# Patient Record
Sex: MALE | Race: WHITE | Employment: FULL TIME | URBAN - METROPOLITAN AREA
[De-identification: names, ages, dates, MRNs, and addresses within clinical notes are randomized per-mention and may not be internally consistent; named-entity substitution may affect disease eponyms.]

---

## 2024-11-03 ENCOUNTER — APPOINTMENT (OUTPATIENT)
Dept: ULTRASOUND IMAGING | Facility: HOSPITAL | Age: 44
End: 2024-11-03
Attending: EMERGENCY MEDICINE
Payer: COMMERCIAL

## 2024-11-03 ENCOUNTER — HOSPITAL ENCOUNTER (EMERGENCY)
Facility: HOSPITAL | Age: 44
Discharge: HOME | End: 2024-11-03
Attending: EMERGENCY MEDICINE
Payer: COMMERCIAL

## 2024-11-03 VITALS
RESPIRATION RATE: 30 BRPM | TEMPERATURE: 97 F | HEART RATE: 61 BPM | SYSTOLIC BLOOD PRESSURE: 122 MMHG | DIASTOLIC BLOOD PRESSURE: 93 MMHG | OXYGEN SATURATION: 97 % | WEIGHT: 315 LBS

## 2024-11-03 DIAGNOSIS — N50.811 TESTICULAR PAIN, RIGHT: Primary | ICD-10-CM

## 2024-11-03 PROCEDURE — 99284 EMERGENCY DEPT VISIT MOD MDM: CPT

## 2024-11-03 PROCEDURE — 96375 TX/PRO/DX INJ NEW DRUG ADDON: CPT

## 2024-11-03 PROCEDURE — 99285 EMERGENCY DEPT VISIT HI MDM: CPT

## 2024-11-03 PROCEDURE — 76870 US EXAM SCROTUM: CPT | Performed by: EMERGENCY MEDICINE

## 2024-11-03 PROCEDURE — 93975 VASCULAR STUDY: CPT | Performed by: EMERGENCY MEDICINE

## 2024-11-03 PROCEDURE — 96374 THER/PROPH/DIAG INJ IV PUSH: CPT

## 2024-11-03 RX ORDER — HYDROMORPHONE HYDROCHLORIDE 1 MG/ML
1 INJECTION, SOLUTION INTRAMUSCULAR; INTRAVENOUS; SUBCUTANEOUS ONCE
Status: COMPLETED | OUTPATIENT
Start: 2024-11-03 | End: 2024-11-03

## 2024-11-03 RX ORDER — HYDROMORPHONE HYDROCHLORIDE 1 MG/ML
1 INJECTION, SOLUTION INTRAMUSCULAR; INTRAVENOUS; SUBCUTANEOUS ONCE
Status: DISCONTINUED | OUTPATIENT
Start: 2024-11-03 | End: 2024-11-03

## 2024-11-03 RX ORDER — MORPHINE SULFATE 4 MG/ML
4 INJECTION, SOLUTION INTRAMUSCULAR; INTRAVENOUS ONCE
Status: DISCONTINUED | OUTPATIENT
Start: 2024-11-03 | End: 2024-11-03

## 2024-11-03 RX ORDER — ONDANSETRON 2 MG/ML
4 INJECTION INTRAMUSCULAR; INTRAVENOUS ONCE
Status: COMPLETED | OUTPATIENT
Start: 2024-11-03 | End: 2024-11-03

## 2024-11-03 RX ORDER — KETOROLAC TROMETHAMINE 15 MG/ML
15 INJECTION, SOLUTION INTRAMUSCULAR; INTRAVENOUS ONCE
Status: DISCONTINUED | OUTPATIENT
Start: 2024-11-03 | End: 2024-11-03

## 2024-11-03 RX ORDER — ONDANSETRON 2 MG/ML
INJECTION INTRAMUSCULAR; INTRAVENOUS
Status: COMPLETED
Start: 2024-11-03 | End: 2024-11-03

## 2024-11-03 RX ORDER — NAPROXEN 500 MG/1
500 TABLET ORAL 2 TIMES DAILY PRN
Qty: 20 TABLET | Refills: 0 | Status: SHIPPED | OUTPATIENT
Start: 2024-11-03 | End: 2024-11-10

## 2024-11-03 RX ORDER — KETOROLAC TROMETHAMINE 30 MG/ML
30 INJECTION, SOLUTION INTRAMUSCULAR; INTRAVENOUS ONCE
Status: COMPLETED | OUTPATIENT
Start: 2024-11-03 | End: 2024-11-03

## 2024-11-03 NOTE — ED PROVIDER NOTES
Patient Seen in: [facility] Emergency Department      History     Chief Complaint   Patient presents with    Jatin     SAT DOWN THIS AM AND TWISTED HIS RIGHT TESTICLE. APPEARS TO BE IN A LOT OF PAIN AT TL.      Stated Complaint: EVAL G    Subjective:   HPI      Patient was hunting this morning, sat down awkwardly on a stool, believes he sat down his right testicle and has been in severe pain since.    Nauseous, never had pain that lasted this long before.        Objective:     No pertinent past medical history.            No pertinent past surgical history.              No pertinent social history.                Physical Exam     ED Triage Vitals   BP --    Pulse 11/03/24 0828 88   Resp 11/03/24 0828 (!) 30   Temp 11/03/24 0845 96.9 °F (36.1 °C)   Temp src 11/03/24 0845 Temporal   SpO2 11/03/24 0828 97 %   O2 Device 11/03/24 0828 None (Room air)       Current Vitals:   Vital Signs  BP: -- (unable to tollerate)  Pulse: 88  Resp: (!) 30  Temp: 96.9 °F (36.1 °C)  Temp src: Temporal    Oxygen Therapy  SpO2: 97 %  O2 Device: None (Room air)        Physical Exam  Physical Exam   Constitutional: Awake, alert, well appearing  Head: Normocephalic and atraumatic.   Eyes: Conjunctivae are normal. Pupils are equal, round, and reactive to light.   Neck: Normal range of motion. No JVD  Cardiovascular: Normal rate, regular rhythm  Pulmonary/Chest: Normal effort.  No accessory muscle use.  No cyanosis.  Abdominal: Soft. Not distended.  Neurological: Pt is alert and oriented to person, place, and time. no cranial nerve deficits. Speech fluent      The right testicle is exquisitely tender, appears to be high riding with an unusual lie.    ED Course   Labs Reviewed - No data to display       Patient was seen upon arrival.  There was clinical concern for torsion but the history was not quite classic for this.  He was given pain meds and taken ultrasound without delay.    Medications   ketorolac (Toradol) 30 MG/ML injection 30  mg (30 mg Intramuscular Given 11/3/24 0845)   HYDROmorphone (Dilaudid) 1 MG/ML injection 1 mg (1 mg Intravenous Given 11/3/24 0844)   ondansetron (Zofran) 4 MG/2ML injection 4 mg (4 mg Intravenous Given 11/3/24 0843)         US SCROTUM W/ DOPPLER (CPT=93975/77773)    Result Date: 11/3/2024  PROCEDURE:  US SCROTUM W/ DOPPLER (CPT=93975/29782)  COMPARISON:  None.  INDICATIONS:  eval for torsion  TECHNIQUE:  Real time grey scale ultrasound was performed of the scrotal contents including the testicles, epididymis, spermatic cord, and scrotal wall. A duplex scan with B-mode, Doppler color flow, and spectral analysis were also performed.  PATIENT STATED HISTORY: (As transcribed by Technologist)  Patient with right testicle pain.    FINDINGS:  TESTES:  Right testicle measures 5.1 x 2.9 x 3.3 cm.  Left testicle measures 5.1 x 2.8 x 3.5 cm.  There are scattered calcifications in both testicles.  There is fairly symmetric Doppler signal with normal Doppler tracings in both testicles. EPIDIDYMIS:  There is mild enlargement of the epididymides bilaterally. HYDROCELE:  Mild amount of fluid in left scrotum could be physiologic. VARICOCELE:  Left-sided varicocele is noted. OTHER:  Negative.            CONCLUSION:  1. There are scattered calcifications in both testicles.  There is no focal mass detected. 2. There is fairly symmetric Doppler signal with normal Doppler tracings in both testicles without specific evidence of torsion. 3. Mild diffuse enlargement of the epididymides bilaterally is noted which could represent epididymitis in the proper clinical setting. 4. Small amount of fluid left scrotum could be physiologic.   LOCATION:  [facility]    Dictated by (CST): Hiram Galeas MD on 11/03/2024 at 9:07 AM     Finalized by (CST): Hiram Galeas MD on 11/03/2024 at 9:10 AM          I reexamined the patient afterwards.  He is much more comfortable.  His testicular exam at this point was normal.  Cremasteric reflex noted with just  lifting the sheets, testicles nontender nonswollen vertical lie no epididymal tenderness.       MDM            Differential diagnoses considered: Testicular torsion, testicular contusion, Muscle spasm all considered    -Reexam after imaging pain meds reassuring.  Patient is much more comfortable.    -Patient's history, exam, diagnostic studies, clinical course were discussed with Dr. Jacobs, urology on-call.  he is okay with discharge home with return precautions.  Agrees the patient  does not have a surgical emergency at this point.      -I did discuss the low likelihood of torsion detorsion and reasons to return to the ER.  Otherwise would recommend Advil Tylenol and close monitoring of symptoms at home.    I visualized the radiology studies, my independent interpretation: All blood flow noted to both testicles on ultrasound    *Discussion of ongoing management of this patient's care included: Urology as noted above      Shared decision making was done by: patient, myself.          Medical Decision Making      Disposition and Plan     Clinical Impression:  1. Testicular pain, right         Disposition:  Discharge  11/3/2024  9:40 am    Follow-up:  Toni Jacobs DO  [address] [facility]  [address] [place] [id number] [phone]    Follow up            Medications Prescribed:  There are no discharge medications for this patient.          Supplementary Documentation:

## 2024-11-03 NOTE — ED INITIAL ASSESSMENT (HPI)
Patient in with R testicle pain sudden onset this AM no hx torsion patient nauseous with severe pain in TL

## 2025-06-20 ENCOUNTER — HOSPITAL ENCOUNTER (OUTPATIENT)
Facility: HOSPITAL | Age: 45
Setting detail: OBSERVATION
Discharge: HOME | End: 2025-06-21
Attending: STUDENT IN AN ORGANIZED HEALTH CARE EDUCATION/TRAINING PROGRAM
Payer: COMMERCIAL

## 2025-06-20 ENCOUNTER — APPOINTMENT (OUTPATIENT)
Dept: GENERAL RADIOLOGY | Facility: HOSPITAL | Age: 45
End: 2025-06-20
Attending: STUDENT IN AN ORGANIZED HEALTH CARE EDUCATION/TRAINING PROGRAM
Payer: COMMERCIAL

## 2025-06-20 DIAGNOSIS — Z77.098 CHEMICAL EXPOSURE: Primary | ICD-10-CM

## 2025-06-20 DIAGNOSIS — R06.00 DYSPNEA, UNSPECIFIED TYPE: ICD-10-CM

## 2025-06-20 LAB
ALBUMIN SERPL-MCNC: 4.8 G/DL (ref 3.2–4.8)
ALBUMIN/GLOB SERPL: 1.5 {RATIO} (ref 1–2)
ALP LIVER SERPL-CCNC: 75 U/L (ref 45–117)
ALT SERPL-CCNC: 138 U/L (ref 10–49)
ANION GAP SERPL CALC-SCNC: 14 MMOL/L (ref 0–18)
AST SERPL-CCNC: 75 U/L (ref ?–34)
BASOPHILS # BLD AUTO: 0.05 X10(3) UL (ref 0–0.2)
BASOPHILS NFR BLD AUTO: 0.6 %
BILIRUB SERPL-MCNC: 0.4 MG/DL (ref 0.3–1.2)
BUN BLD-MCNC: 19 MG/DL (ref 9–23)
CALCIUM BLD-MCNC: 9.3 MG/DL (ref 8.7–10.6)
CHLORIDE SERPL-SCNC: 105 MMOL/L (ref 98–112)
CO2 SERPL-SCNC: 20 MMOL/L (ref 21–32)
CREAT BLD-MCNC: 1.16 MG/DL (ref 0.7–1.3)
EGFRCR SERPLBLD CKD-EPI 2021: 80 ML/MIN/1.73M2 (ref 60–?)
EOSINOPHIL # BLD AUTO: 0.16 X10(3) UL (ref 0–0.7)
EOSINOPHIL NFR BLD AUTO: 1.9 %
ERYTHROCYTE [DISTWIDTH] IN BLOOD BY AUTOMATED COUNT: 12.7 %
GLOBULIN PLAS-MCNC: 3.1 G/DL (ref 2–3.5)
GLUCOSE BLD-MCNC: 111 MG/DL (ref 70–99)
HCT VFR BLD AUTO: 44.7 % (ref 39–53)
HGB BLD-MCNC: 16.2 G/DL (ref 13–17.5)
IMM GRANULOCYTES # BLD AUTO: 0.04 X10(3) UL (ref 0–1)
IMM GRANULOCYTES NFR BLD: 0.5 %
LYMPHOCYTES # BLD AUTO: 3.21 X10(3) UL (ref 1–4)
LYMPHOCYTES NFR BLD AUTO: 38 %
MCH RBC QN AUTO: 32.9 PG (ref 26–34)
MCHC RBC AUTO-ENTMCNC: 36.2 G/DL (ref 31–37)
MCV RBC AUTO: 90.9 FL (ref 80–100)
MONOCYTES # BLD AUTO: 0.96 X10(3) UL (ref 0.1–1)
MONOCYTES NFR BLD AUTO: 11.4 %
NEUTROPHILS # BLD AUTO: 4.02 X10 (3) UL (ref 1.5–7.7)
NEUTROPHILS # BLD AUTO: 4.02 X10(3) UL (ref 1.5–7.7)
NEUTROPHILS NFR BLD AUTO: 47.6 %
OSMOLALITY SERPL CALC.SUM OF ELEC: 291 MOSM/KG (ref 275–295)
PLATELET # BLD AUTO: 382 10(3)UL (ref 150–450)
POTASSIUM SERPL-SCNC: 4.1 MMOL/L (ref 3.5–5.1)
PROT SERPL-MCNC: 7.9 G/DL (ref 5.7–8.2)
RBC # BLD AUTO: 4.92 X10(6)UL (ref 4.3–5.7)
SODIUM SERPL-SCNC: 139 MMOL/L (ref 136–145)
WBC # BLD AUTO: 8.4 X10(3) UL (ref 4–11)

## 2025-06-20 PROCEDURE — 99223 1ST HOSP IP/OBS HIGH 75: CPT | Performed by: STUDENT IN AN ORGANIZED HEALTH CARE EDUCATION/TRAINING PROGRAM

## 2025-06-20 PROCEDURE — 71045 X-RAY EXAM CHEST 1 VIEW: CPT | Performed by: STUDENT IN AN ORGANIZED HEALTH CARE EDUCATION/TRAINING PROGRAM

## 2025-06-20 RX ORDER — TETRACAINE HYDROCHLORIDE 5 MG/ML
2 SOLUTION OPHTHALMIC ONCE
Status: COMPLETED | OUTPATIENT
Start: 2025-06-20 | End: 2025-06-20

## 2025-06-20 RX ORDER — DIPHENHYDRAMINE HYDROCHLORIDE 50 MG/ML
50 INJECTION, SOLUTION INTRAMUSCULAR; INTRAVENOUS ONCE
Status: COMPLETED | OUTPATIENT
Start: 2025-06-20 | End: 2025-06-20

## 2025-06-20 RX ORDER — FAMOTIDINE 10 MG/ML
20 INJECTION, SOLUTION INTRAVENOUS ONCE
Status: COMPLETED | OUTPATIENT
Start: 2025-06-20 | End: 2025-06-20

## 2025-06-20 RX ORDER — METHYLPREDNISOLONE SODIUM SUCCINATE 125 MG/2ML
125 INJECTION INTRAMUSCULAR; INTRAVENOUS ONCE
Status: COMPLETED | OUTPATIENT
Start: 2025-06-20 | End: 2025-06-20

## 2025-06-21 VITALS
HEART RATE: 99 BPM | OXYGEN SATURATION: 97 % | TEMPERATURE: 98 F | DIASTOLIC BLOOD PRESSURE: 98 MMHG | SYSTOLIC BLOOD PRESSURE: 155 MMHG | RESPIRATION RATE: 22 BRPM

## 2025-06-21 PROBLEM — R06.00 DYSPNEA: Status: ACTIVE | Noted: 2025-06-21

## 2025-06-21 PROBLEM — R06.00 DYSPNEA, UNSPECIFIED TYPE: Status: ACTIVE | Noted: 2025-06-21

## 2025-06-21 LAB
ATRIAL RATE: 110 BPM
P AXIS: 25 DEGREES
P-R INTERVAL: 166 MS
Q-T INTERVAL: 338 MS
QRS DURATION: 88 MS
QTC CALCULATION (BEZET): 457 MS
R AXIS: -6 DEGREES
T AXIS: 61 DEGREES
VENTRICULAR RATE: 110 BPM

## 2025-06-21 PROCEDURE — 99239 HOSP IP/OBS DSCHRG MGMT >30: CPT | Performed by: HOSPITALIST

## 2025-06-21 RX ORDER — PROCHLORPERAZINE EDISYLATE 5 MG/ML
5 INJECTION INTRAMUSCULAR; INTRAVENOUS EVERY 8 HOURS PRN
Status: DISCONTINUED | OUTPATIENT
Start: 2025-06-21 | End: 2025-06-21

## 2025-06-21 RX ORDER — BISACODYL 10 MG
10 SUPPOSITORY, RECTAL RECTAL
Status: DISCONTINUED | OUTPATIENT
Start: 2025-06-21 | End: 2025-06-21

## 2025-06-21 RX ORDER — SODIUM CHLORIDE 9 MG/ML
INJECTION, SOLUTION INTRAVENOUS CONTINUOUS
Status: DISCONTINUED | OUTPATIENT
Start: 2025-06-21 | End: 2025-06-21

## 2025-06-21 RX ORDER — POLYETHYLENE GLYCOL 3350 17 G/17G
17 POWDER, FOR SOLUTION ORAL DAILY PRN
Status: DISCONTINUED | OUTPATIENT
Start: 2025-06-21 | End: 2025-06-21

## 2025-06-21 RX ORDER — GUAIFENESIN 600 MG/1
600 TABLET, EXTENDED RELEASE ORAL 2 TIMES DAILY PRN
Status: DISCONTINUED | OUTPATIENT
Start: 2025-06-21 | End: 2025-06-21

## 2025-06-21 RX ORDER — SODIUM PHOSPHATE, DIBASIC AND SODIUM PHOSPHATE, MONOBASIC 7; 19 G/230ML; G/230ML
1 ENEMA RECTAL ONCE AS NEEDED
Status: DISCONTINUED | OUTPATIENT
Start: 2025-06-21 | End: 2025-06-21

## 2025-06-21 RX ORDER — IPRATROPIUM BROMIDE AND ALBUTEROL SULFATE 2.5; .5 MG/3ML; MG/3ML
3 SOLUTION RESPIRATORY (INHALATION) EVERY 4 HOURS PRN
Status: DISCONTINUED | OUTPATIENT
Start: 2025-06-21 | End: 2025-06-21

## 2025-06-21 RX ORDER — BENZONATATE 200 MG/1
200 CAPSULE ORAL 3 TIMES DAILY PRN
Status: DISCONTINUED | OUTPATIENT
Start: 2025-06-21 | End: 2025-06-21

## 2025-06-21 RX ORDER — ENOXAPARIN SODIUM 100 MG/ML
40 INJECTION SUBCUTANEOUS DAILY
Status: DISCONTINUED | OUTPATIENT
Start: 2025-06-21 | End: 2025-06-21

## 2025-06-21 RX ORDER — DIPHENHYDRAMINE HYDROCHLORIDE 50 MG/ML
25 INJECTION, SOLUTION INTRAMUSCULAR; INTRAVENOUS EVERY 4 HOURS PRN
Status: DISCONTINUED | OUTPATIENT
Start: 2025-06-21 | End: 2025-06-21

## 2025-06-21 RX ORDER — SENNOSIDES 8.6 MG
17.2 TABLET ORAL NIGHTLY PRN
Status: DISCONTINUED | OUTPATIENT
Start: 2025-06-21 | End: 2025-06-21

## 2025-06-21 RX ORDER — ECHINACEA PURPUREA EXTRACT 125 MG
1 TABLET ORAL
Status: DISCONTINUED | OUTPATIENT
Start: 2025-06-21 | End: 2025-06-21

## 2025-06-21 RX ORDER — ACETAMINOPHEN 500 MG
500 TABLET ORAL EVERY 4 HOURS PRN
Status: DISCONTINUED | OUTPATIENT
Start: 2025-06-21 | End: 2025-06-21

## 2025-06-21 RX ORDER — ONDANSETRON 2 MG/ML
4 INJECTION INTRAMUSCULAR; INTRAVENOUS EVERY 6 HOURS PRN
Status: DISCONTINUED | OUTPATIENT
Start: 2025-06-21 | End: 2025-06-21

## 2025-06-21 NOTE — ED INITIAL ASSESSMENT (HPI)
C/o MELBA after chlorine exposure, states he got a whiff when opening the bottle. Diaphoretic with  dyspnea on arrival. 91% on RA, up to 100% on O2 4L per nc.

## 2025-06-21 NOTE — SPIRITUAL CARE NOTE
Spiritual Care Visit Note    Patient Name: Richard Argueta Date of Spiritual Care Visit: 25   : 1980 Primary Dx: Chemical exposure   Referred By: ED rounds     Spiritual Care Taxonomy:    Intended Effects: Demonstrate caring and concern    Methods: Demonstrate acceptance, Encourage sharing of feelings, Encourage story-telling, Offer support    Interventions: Acknowledge current situation, Active listening, Discuss concerns, Share words of hope and inspiration    Visit Type/Summary:  Richard was awake, sitting up in bed with no visitors when  arrived at his room.  He said he inhaled chemicals which brought him to ED.  He said he felt he would be alright and hoped he had not severely damaged his lungs, etc.  He thanked  for visit.   - Spiritual Care: Offered empathic listening and emotional support. Provided information regarding how to contact Spiritual Care.  remains available as needed for follow up.    Spiritual Care support can be requested via an Epic consult. For urgent/immediate needs, please contact the On Call  at: [facility]: ext [phone]    Chelsea Tay MDiv.  Staff

## 2025-06-21 NOTE — RESPIRATORY THERAPY NOTE
Patient states he has burning sensation while taking respirations.     Patient placed on cool aerosol mask 10 L and FiO2 of 40%. Breath sounds are clear/diminished. Currently no stridor noted during RT assessment. Patient is diaphoretic.    Patient states he feels better with cool aerosol.     Will continue to follow RT protocol.    Tomas Mendoza, RRT

## 2025-06-21 NOTE — ED PROVIDER NOTES
Patient Seen in: [facility]      History     Chief Complaint   Patient presents with    Exposure,Chem Occupational     Stated Complaint: Chlorine Inhalaion - O2 Sat RA - 91% - MD - 135 Pt diaphoretic    Subjective:   HPI    Patient is a 45-year-old male who presents to the emergency department with difficulty breathing, nasal and eye irritation after he had been exposed to powder from chlorine tablets.  Patient was opening a box of chlorine tablets that are meant for the pool that he had had around for the last 3 years and as he opened the box some powder came out as he went to smell the box causing him to have chemical exploded closure to his face, eyes, nose and throat.  Patient arrived red in the face with tearing slight cough and slight difficulty breathing.    Emergently brought back to treatment area on arrival.    Objective:   History reviewed. No pertinent past medical history.           History reviewed. No pertinent surgical history.             No pertinent social history.            Review of Systems    Positive for stated complaint: Chlorine Inhalaion - O2 Sat RA - 91% - MD - 135 Pt diaphoretic  Other systems are as noted in HPI.  Constitutional and vital signs reviewed.      All other systems reviewed and negative except as noted above.    Physical Exam     ED Triage Vitals   BP 06/20/25 2230 122/81   Pulse 06/20/25 2124 (!) 135   Resp 06/20/25 2149 21   Temp 06/20/25 2133 98.4 °F (36.9 °C)   Temp src 06/20/25 2133 Oral   SpO2 06/20/25 2124 (S) 91 %   O2 Device 06/20/25 2124 None (Room air)       Current:BP (!) 155/98 (BP Location: Left arm)   Pulse 99   Temp 98.2 °F (36.8 °C) (Oral)   Resp 22   SpO2 97%         Physical Exam    Constitutional: Redness of the face along with diaphoresis is present.  Eyes: No scleral icterus, mild conjunctival injection with severe tearing is present  Nose with nasal congestion noted  Mouth/throat: Pharyngeal redness is present  Heart: regular rate  rhythm, no murmurs  Lungs: Tachypneic, mild stridor initially noted, clear to auscultation bilaterally  Skin: No rash  Neuro: Alert and oriented ×3          ED Course/ My interpretations:     Labs Reviewed   COMP METABOLIC PANEL (14) - Abnormal; Notable for the following components:       Result Value    Glucose 111 (*)     CO2 20.0 (*)     AST 75 (*)      (*)     All other components within normal limits   CBC WITH DIFFERENTIAL WITH PLATELET   RAINBOW DRAW LAVENDER   RAINBOW DRAW LIGHT GREEN   RAINBOW DRAW BLUE     EKG    Rate, intervals and axes as noted on EKG Report.  Rate: 110  Rhythm: Sinus Rhythm  Reading: Sinus tachycardia, normal intervals, normal axis, no ST or T wave abnormalities noted.  No previous EKG available for comparison.             My independent imaging interpretation:      Procedures    Comp Metabolic Panel (14)    CBC With Differential With Platelet    Comp Metabolic Panel (14)    CBC With Differential With Platelet    XR CHEST AP PORTABLE  (CPT=71045)      No consolidation, no pneumonitis  All available radiology reports for this visit reviewed.      Medications given:  Orders Placed This Encounter    Comp Metabolic Panel (14)    CBC With Differential With Platelet    Comp Metabolic Panel (14)    CBC With Differential With Platelet    diphenhydrAMINE (Benadryl) 50 mg/mL  injection 50 mg    methylPREDNISolone sodium succinate (Solu-MEDROL) injection 125 mg    famotidine (Pepcid) 20 mg/2mL injection 20 mg    sodium chloride 0.9 % IV bolus 1,000 mL    tetracaine (Pontocaine) 0.5 % ophthalmic solution 2 drop    sodium chloride 0.9 % IV bolus 1,000 mL    diphenhydrAMINE (Benadryl) 50 mg/mL  injection 25 mg    acetaminophen (Tylenol Extra Strength) tab 500 mg    melatonin tab 3 mg    guaiFENesin ER (Mucinex) 12 hr tab 600 mg    benzonatate (Tessalon) cap 200 mg    glycerin-hypromellose- (Artificial Tears) 0.2-0.2-1 % ophthalmic solution 1 drop    sodium chloride (Saline Mist) 0.65 %  nasal solution 1 spray    sodium chloride 0.9% infusion    enoxaparin (Lovenox) 40 MG/0.4ML SUBQ injection 40 mg    ondansetron (Zofran) 4 MG/2ML injection 4 mg    prochlorperazine (Compazine) 10 MG/2ML injection 5 mg    polyethylene glycol (PEG 3350) (Miralax) 17 g oral packet 17 g    sennosides (Senokot) tab 17.2 mg    bisacodyl (Dulcolax) 10 MG rectal suppository 10 mg    fleet enema (Fleet) rectal enema 133 mL    ipratropium-albuterol (Duoneb) 0.5-2.5 (3) MG/3ML inhalation solution 3 mL                    MDM      As the patient was being hooked up to monitor and being evaluated initially noted to be severely tachypneic with only 91% saturation on room air despite severely increased work of breathing.  Patient then cleared his throat several times and coughed up some mucus which he was able to spit out.    Placed on oxygen via nasal cannula with significant improvement in breathing.    Monitor quite closely by myself and nursing staff as well as respiratory therapist at bedside given the initial concerns about his airway.  Patient kept on humidified air with FiO2 40% initially.  Able to discontinue as patient continued to improve.    Patient continued to improve while in the ED with complete resolution of tachypnea and stridor.    Eyes irrigated with saline with improvement.  Patient reports no visual loss.    Patient admitted to the hospital for observation given significant airway involvement concern initially in the setting of chemical exposure to the face/airway.    Admission disposition: 6/20/2025 11:32 PM           Medical Decision Making      Disposition and Plan     Clinical Impression:  1. Chemical exposure    2. Dyspnea, unspecified type         Disposition:  Admit  6/20/2025 11:32 pm    Follow-up:  No follow-up provider specified.        Medications Prescribed:  There are no discharge medications for this patient.      Supplementary Documentation:         Hospital Problems       Present on Admission            ICD-10-CM Noted POA    * (Principal) Chemical exposure Z77.098 6/20/2025 Unknown    Dyspnea R06.00 6/21/2025 Unknown    Dyspnea, unspecified type R06.00 6/21/2025 Unknown                      minutes of critical care time (exclusive of billable procedures) was administered to manage the patient's unstable vital signs due to her hypotension. This involved direct patient intervention, complex decision making, and/or extensive discussions with the patient, family, and clinical staff.                           Hospital Problems       Present on Admission           ICD-10-CM Noted POA    * (Principal) Chemical exposure Z77.098 6/20/2025 Unknown    Dyspnea R06.00 6/21/2025 Unknown    Dyspnea, unspecified type R06.00 6/21/2025 Unknown           Documentation created with the aid of Dragon voice recognition software.  Although efforts were made to ensure the accuracy of the note, some inaccuracies may persist.

## 2025-06-21 NOTE — DISCHARGE SUMMARY
[facility]     Richard Argueta Patient Status:  Observation    1980 MRN [id number]   Location [facility] Attending No att. providers found   Hosp Day # 0 PCP Kenny Marc DO     Date of Admission: 2025  Date of Discharge:  2025     Discharge Disposition: Home or Self Care    Discharge Diagnosis:  1.  Acute dyspnea  2.  Chlorine exposure     History of Present Illness:   Richard Argueta is a 45 year old male with no significant past medical history who presents to ED for dyspnea.  Patient was opening a bottle of chlorine tabs to put chlorine in his pool.  Bottle was old.  Patient reports he began having dyspnea after getting up with of the open bottle.  He felt diaphoretic and felt like he could not breathe.  He also felt like he could not see as powder had gone into his eyes.  He reported burning sensation while taking respirations.     Brief Synopsis:   Acute dyspnea, resolved  #Chlorine exposure  - Given Benadryl, Pepcid, Solu-Medrol, tetracaine eyedrops in ER  - Placed on cool aerosol mask in ER  - Patient felt significantly better afterwards  - Nebs as needed  - Patient was monitored overnight next morning patient fully recovered was not short of breath at all  and eyes were not irritated.  With vital stable patient was cleared for discharge home.  Lace+ Score: 24  59-90 High Risk  29-58 Medium Risk  0-28   Low Risk       TCM Follow-Up Recommendation:  LACE 29-58: Moderate Risk of readmission after discharge from the hospital.      Procedures during hospitalization:   None    Incidental or significant findings and recommendations (brief descriptions):  N/A    Lab/Test results pending at Discharge:   N/A    Consultants:  None    Discharge Medication List:     Discharge Medications      You have not been prescribed any medications.         [place]    Follow-up appointment:   Kenny Marc [place]  [address] [facility] [address]  [address]  [phone]    Call in 2 day(s)      Appointments for Next 30 Days 2025 - 2025      None            Vital signs:  Temp:  [98.2 °F (36.8 °C)-98.4 °F (36.9 °C)] 98.2 °F (36.8 °C)  Pulse:  [] 99  Resp:  [20-28] 22  BP: (118-155)/(61-98) 155/98  SpO2:  [91 %-100 %] 97 %  FiO2 (%):  [40 %-60 %] 40 %    Physical Exam:    General: No acute distress   Lungs: clear to auscultation  Cardiovascular: S1, S2  Abdomen: Soft      -----------------------------------------------------------------------------------------------  PATIENT DISCHARGE INSTRUCTIONS: See electronic chart    Filiberto Rivera, DO    Total time spent on discharge plannin minutes     The 21st Century Cures Act makes medical notes like these available to patients in the interest of transparency. Please be advised this is a medical document. Medical documents are intended to carry relevant information, facts as evident, and the clinical opinion of the practitioner. The medical note is intended as peer to peer communication and may appear blunt or direct. It is written in medical language and may contain abbreviations or verbiage that are unfamiliar.

## 2025-06-21 NOTE — H&P
[facility]     Richard Argueta Patient Status:  Emergency    1980 MRN [id number]   Location [facility] EMERGENCY DEPARTMENT Attending Melissa Nazario MD   Hosp Day # 0 PCP Kenny Marc DO     Chief Complaint: Dyspnea    Subjective:    History of Present Illness:     Richard Argueta is a 45 year old male with no significant past medical history who presents to ED for dyspnea.  Patient was opening a bottle of chlorine tabs to put chlorine in his pool.  Bottle was old.  Patient reports he began having dyspnea after getting up with of the open bottle.  He felt diaphoretic and felt like he could not breathe.  He also felt like he could not see as powder had gone into his eyes.  He reported burning sensation while taking respirations.    History/Other:    Past Medical History:  Past Medical History[1]  Past Surgical History:   Past Surgical History[2]   Family History:   Family History[3]  Social History:         Allergies: Allergies[4]    Medications:  Medications Ordered Prior to Encounter[5]    Review of Systems:   A comprehensive review of systems was completed.    Pertinent positives and negatives noted in the HPI.    Objective:   Physical Exam:    /73   Pulse 86   Temp 98.4 °F (36.9 °C) (Oral)   Resp 22   SpO2 95%   General: No acute distress, Alert  Respiratory: No rhonchi, no wheezes  Cardiovascular: S1, S2. Regular rate and rhythm  Abdomen: Soft, Non-tender, non-distended, positive bowel sounds  Neuro: No new focal deficits  Extremities: No edema    Results:    Labs:      Labs Last 24 Hours:    Recent Labs   Lab 25  2141   RBC 4.92   HGB 16.2   HCT 44.7   MCV 90.9   MCH 32.9   MCHC 36.2   RDW 12.7   NEPRELIM 4.02   WBC 8.4   .0       Recent Labs   Lab 25  2141   *   BUN 19   CREATSERUM 1.16   EGFRCR 80   CA 9.3   ALB 4.8      K 4.1      CO2 20.0*   ALKPHO 75   AST 75*   *   BILT 0.4   TP 7.9       Estimated  Glomerular Filtration Rate: 80 mL/min/1.73m2 (result from lab).    No results found for: \"PT\", \"INR\"    No results for input(s): \"TROP\", \"TROPHS\", \"CK\" in the last 168 hours.    No results for input(s): \"TROP\", \"PBNP\" in the last 168 hours.    No results for input(s): \"PCT\" in the last 168 hours.    Imaging: Imaging data reviewed in Epic.    Assessment & Plan:      #Acute dyspnea, resolved  #Chlorine exposure  - Given Benadryl, Pepcid, Solu-Medrol, tetracaine eyedrops in ER  - Placed on cool aerosol mask in ER  - Patient felt significantly better afterwards  - Nebs as needed  - Monitor closely overnight        Plan of care discussed with patient    Juan Newsome,     Supplementary Documentation:     The 21st Century Cures Act makes medical notes like these available to patients in the interest of transparency. Please be advised this is a medical document. Medical documents are intended to carry relevant information, facts as evident, and the clinical opinion of the practitioner. The medical note is intended as peer to peer communication and may appear blunt or direct. It is written in medical language and may contain abbreviations or verbiage that are unfamiliar.                                       [1] History reviewed. No pertinent past medical history.  [2] History reviewed. No pertinent surgical history.  [3] History reviewed. No pertinent family history.  [4]   Allergies  Allergen Reactions    Pertussis Vaccines UNKNOWN     As a baby   [5]   No current facility-administered medications on file prior to encounter.     No current outpatient medications on file prior to encounter.

## 2025-06-21 NOTE — PLAN OF CARE
Problem: Patient/Family Goals  Goal: Patient/Family Long Term Goal  Description: Patient's Long Term Goal:   Resolution of symptoms after a chemical exposure    Interventions:  - monitor vitals and  for MELBA, supplemental O2 as needed  - See additional Care Plan goals for specific interventions  Outcome: Progressing  Goal: Patient/Family Short Term Goal  Description: Patient's Short Term Goal:   06/21/2025 - rest and sleep    Interventions:   - cluster cre, dimlights, keep room quiet, manage pain/discomfort  - See additional Care Plan goals for specific interventions  Outcome: Progressing     Problem: RESPIRATORY - ADULT  Goal: Achieves optimal ventilation and oxygenation  Description: INTERVENTIONS:  - Assess for changes in respiratory status  - Assess for changes in mentation and behavior  - Position to facilitate oxygenation and minimize respiratory effort  - Oxygen supplementation based on oxygen saturation or ABGs  - Provide Smoking Cessation handout, if applicable  - Encourage broncho-pulmonary hygiene including cough, deep breathe, Incentive Spirometry  - Assess the need for suctioning and perform as needed  - Assess and instruct to report SOB or any respiratory difficulty  - Respiratory Therapy support as indicated  - Manage/alleviate anxiety  - Monitor for signs/symptoms of CO2 retention  Outcome: Progressing

## 2025-06-21 NOTE — PLAN OF CARE
Problem: Patient/Family Goals  Goal: Patient/Family Long Term Goal  Description: Patient's Long Term Goal:   Resolution of symptoms after a chemical exposure    Interventions:  - monitor vitals and  for MELBA, supplemental O2 as needed  - See additional Care Plan goals for specific interventions  Outcome: Completed  Goal: Patient/Family Short Term Goal  Description: Patient's Short Term Goal:   06/21/2025 - rest and sleep    Interventions:   - cluster cre, dimlights, keep room quiet, manage pain/discomfort  - See additional Care Plan goals for specific interventions  Outcome: Completed     Problem: RESPIRATORY - ADULT  Goal: Achieves optimal ventilation and oxygenation  Description: INTERVENTIONS:  - Assess for changes in respiratory status  - Assess for changes in mentation and behavior  - Position to facilitate oxygenation and minimize respiratory effort  - Oxygen supplementation based on oxygen saturation or ABGs  - Provide Smoking Cessation handout, if applicable  - Encourage broncho-pulmonary hygiene including cough, deep breathe, Incentive Spirometry  - Assess the need for suctioning and perform as needed  - Assess and instruct to report SOB or any respiratory difficulty  - Respiratory Therapy support as indicated  - Manage/alleviate anxiety  - Monitor for signs/symptoms of CO2 retention  Outcome: Completed

## 2025-06-21 NOTE — PLAN OF CARE
NURSING DISCHARGE NOTE    Discharged Home via Ambulatory.  Accompanied by self.  Belongings Taken by patient/family.    PIV removed. AVS reviewed with patient.

## 2025-06-21 NOTE — PROGRESS NOTES
NURSING ADMISSION NOTE      Patient admitted via Cart.  Oriented to room.  Safety precautions initiated.  Bed in low position.  Call light in reach.